# Patient Record
Sex: MALE | Race: OTHER | Employment: OTHER | URBAN - METROPOLITAN AREA
[De-identification: names, ages, dates, MRNs, and addresses within clinical notes are randomized per-mention and may not be internally consistent; named-entity substitution may affect disease eponyms.]

---

## 2023-02-23 ENCOUNTER — HOSPITAL ENCOUNTER (OUTPATIENT)
Age: 78
Discharge: HOME | End: 2023-02-23
Payer: MEDICARE

## 2023-02-23 LAB
BASOPHILS ABSOLUTE: 0.07 E9/L (ref 0–0.2)
BASOPHILS RELATIVE PERCENT: 0.6 % (ref 0–2)
EOSINOPHILS ABSOLUTE: 0.34 E9/L (ref 0.05–0.5)
EOSINOPHILS RELATIVE PERCENT: 3 % (ref 0–6)
HCT VFR BLD CALC: 38.6 % (ref 37–54)
HEMOGLOBIN: 12.8 G/DL (ref 12.5–16.5)
IMMATURE GRANULOCYTES #: 0.05 E9/L
IMMATURE GRANULOCYTES %: 0.4 % (ref 0–5)
LYMPHOCYTES ABSOLUTE: 4.45 E9/L (ref 1.5–4)
LYMPHOCYTES RELATIVE PERCENT: 38.8 % (ref 20–42)
MCH RBC QN AUTO: 29.8 PG (ref 26–35)
MCHC RBC AUTO-ENTMCNC: 33.2 % (ref 32–34.5)
MCV RBC AUTO: 90 FL (ref 80–99.9)
MONOCYTES ABSOLUTE: 0.88 E9/L (ref 0.1–0.95)
MONOCYTES RELATIVE PERCENT: 7.7 % (ref 2–12)
NEUTROPHILS ABSOLUTE: 5.67 E9/L (ref 1.8–7.3)
NEUTROPHILS RELATIVE PERCENT: 49.5 % (ref 43–80)
PATHOLOGIST REVIEW: NORMAL
PDW BLD-RTO: 13 FL (ref 11.5–15)
PLATELET # BLD: 233 E9/L (ref 130–450)
PMV BLD AUTO: 9.9 FL (ref 7–12)
RBC # BLD: 4.29 E12/L (ref 3.8–5.8)
WBC # BLD: 11.5 E9/L (ref 4.5–11.5)

## 2023-02-23 PROCEDURE — 36415 COLL VENOUS BLD VENIPUNCTURE: CPT

## 2023-02-23 PROCEDURE — 85025 COMPLETE CBC W/AUTO DIFF WBC: CPT

## 2023-03-23 ENCOUNTER — HOSPITAL ENCOUNTER (OUTPATIENT)
Age: 78
Discharge: HOME | End: 2023-03-23
Payer: MEDICARE

## 2023-03-23 LAB — PSA SERPL-MCNC: 5.34 NG/ML (ref 0–4)

## 2023-03-23 PROCEDURE — 36415 COLL VENOUS BLD VENIPUNCTURE: CPT

## 2023-03-23 PROCEDURE — 84153 ASSAY OF PSA TOTAL: CPT

## 2023-03-23 PROCEDURE — 84154 ASSAY OF PSA FREE: CPT

## 2023-03-27 LAB
PSA FREE MFR SERPL: 12 %
PSA FREE SERPL-MCNC: 0.6 NG/ML
PSA SERPL-MCNC: 5.2 NG/ML (ref 0–4)

## 2023-05-01 ENCOUNTER — HOSPITAL ENCOUNTER (EMERGENCY)
Age: 78
Discharge: HOME | End: 2023-05-01
Payer: MEDICARE

## 2023-05-01 VITALS
SYSTOLIC BLOOD PRESSURE: 139 MMHG | OXYGEN SATURATION: 98 % | HEART RATE: 71 BPM | RESPIRATION RATE: 16 BRPM | DIASTOLIC BLOOD PRESSURE: 72 MMHG | TEMPERATURE: 98.6 F

## 2023-05-01 DIAGNOSIS — L03.032 PARONYCHIA OF GREAT TOE OF LEFT FOOT: Primary | ICD-10-CM

## 2023-05-01 PROCEDURE — 99283 EMERGENCY DEPT VISIT LOW MDM: CPT

## 2023-05-01 PROCEDURE — 6370000000 HC RX 637 (ALT 250 FOR IP): Performed by: PHYSICIAN ASSISTANT

## 2023-05-01 RX ORDER — CEPHALEXIN 500 MG/1
500 CAPSULE ORAL 4 TIMES DAILY
Qty: 28 CAPSULE | Refills: 0 | Status: SHIPPED | OUTPATIENT
Start: 2023-05-01 | End: 2023-05-08

## 2023-05-01 RX ORDER — CEPHALEXIN 500 MG/1
500 CAPSULE ORAL ONCE
Status: COMPLETED | OUTPATIENT
Start: 2023-05-01 | End: 2023-05-01

## 2023-05-01 RX ORDER — IBUPROFEN 600 MG/1
600 TABLET ORAL ONCE
Status: COMPLETED | OUTPATIENT
Start: 2023-05-01 | End: 2023-05-01

## 2023-05-01 RX ADMIN — IBUPROFEN 600 MG: 600 TABLET, FILM COATED ORAL at 15:44

## 2023-05-01 RX ADMIN — CEPHALEXIN 500 MG: 500 CAPSULE ORAL at 15:44

## 2023-05-01 ASSESSMENT — PAIN DESCRIPTION - ORIENTATION
ORIENTATION: LEFT
ORIENTATION: LEFT

## 2023-05-01 ASSESSMENT — PAIN SCALES - GENERAL
PAINLEVEL_OUTOF10: 4
PAINLEVEL_OUTOF10: 7

## 2023-05-01 ASSESSMENT — PAIN - FUNCTIONAL ASSESSMENT
PAIN_FUNCTIONAL_ASSESSMENT: ACTIVITIES ARE NOT PREVENTED
PAIN_FUNCTIONAL_ASSESSMENT: 0-10

## 2023-05-01 ASSESSMENT — PAIN DESCRIPTION - LOCATION
LOCATION: TOE (COMMENT WHICH ONE)
LOCATION: TOE (COMMENT WHICH ONE)

## 2023-05-01 NOTE — ED PROVIDER NOTES
Independent RENNY Visit. [facility]  ED  Encounter Note  Admit Date/RoomTime: 2023  3:21 PM  ED Room:   NAME: Kelechi Griffin  : 1945  MRN: [id number]  PCP: Rufina Perera    CHIEF COMPLAINT     Toe Pain (Lt great toe pain and swelling)    HISTORY OF PRESENT ILLNESS        Kelechi Griffin is a 69 y.o. male who presents to the ED by private vehicle for redness and swelling left great toe, beginning few days ago. The complaint has been persistent and are mild in severity. The patient is a diabetic. He states that his sugars have been under good control. Has just localized redness swelling and pain around the left great toenail. There is no drainage at this time. He does have throbbing pain at the site. States he has never seen a podiatrist.  Denies fever, chills or vomiting. REVIEW OF SYSTEMS     Pertinent positives and negatives are stated within HPI, all other systems reviewed and are negative. Past Medical History:  has no past medical history on file. Surgical History:  has no past surgical history on file. Social History:    Family History: family history is not on file. Allergies: Patient has no known allergies. CURRENT MEDICATIONS       Previous Medications    No medications on file       SCREENINGS     [place] Coma Scale  Eye Opening: Spontaneous  Best Verbal Response: Oriented  Best Motor Response: Obeys commands  [place] Coma Scale Score: 15         CIWA Assessment  BP: 139/72  Heart Rate: 71       PHYSICAL EXAM   Oxygen Saturation Interpretation: Normal on room air analysis. ED Triage Vitals [23 1500]   BP Temp Temp Source Heart Rate Resp SpO2 Height Weight   139/72 98.6 °F (37 °C) Oral 71 16 98 % -- --         Physical Exam  Constitutional/General: Alert and oriented x3, well appearing, non toxic  HEENT:  NC/NT. PERRLA,  Airway patent. Neck: Supple, full ROM, non tender to palpation in the midline.

## 2023-11-27 ENCOUNTER — HOSPITAL ENCOUNTER (OUTPATIENT)
Dept: GENERAL RADIOLOGY | Age: 78
Discharge: HOME | End: 2023-11-29
Payer: MEDICARE

## 2023-11-27 ENCOUNTER — HOSPITAL ENCOUNTER (OUTPATIENT)
Age: 78
Discharge: HOME | End: 2023-11-27
Payer: MEDICARE

## 2023-11-27 ENCOUNTER — HOSPITAL ENCOUNTER (OUTPATIENT)
Age: 78
Discharge: HOME | End: 2023-11-29
Payer: MEDICARE

## 2023-11-27 DIAGNOSIS — R52 PAIN: ICD-10-CM

## 2023-11-27 LAB — PSA SERPL-MCNC: 7.59 NG/ML (ref 0–4)

## 2023-11-27 PROCEDURE — 84153 ASSAY OF PSA TOTAL: CPT

## 2023-11-27 PROCEDURE — 73030 X-RAY EXAM OF SHOULDER: CPT

## 2023-11-27 PROCEDURE — 36415 COLL VENOUS BLD VENIPUNCTURE: CPT

## 2024-07-10 LAB — PSA SERPL-MCNC: 6.8 NG/ML (ref 0–4)
